# Patient Record
Sex: FEMALE | Race: WHITE | NOT HISPANIC OR LATINO | Employment: OTHER | URBAN - METROPOLITAN AREA
[De-identification: names, ages, dates, MRNs, and addresses within clinical notes are randomized per-mention and may not be internally consistent; named-entity substitution may affect disease eponyms.]

---

## 2021-08-25 ENCOUNTER — EVALUATION (OUTPATIENT)
Dept: PHYSICAL THERAPY | Facility: MEDICAL CENTER | Age: 81
End: 2021-08-25
Payer: COMMERCIAL

## 2021-08-25 DIAGNOSIS — M25.551 RIGHT HIP PAIN: ICD-10-CM

## 2021-08-25 DIAGNOSIS — G89.29 CHRONIC RIGHT-SIDED LOW BACK PAIN, UNSPECIFIED WHETHER SCIATICA PRESENT: Primary | ICD-10-CM

## 2021-08-25 DIAGNOSIS — M54.50 CHRONIC RIGHT-SIDED LOW BACK PAIN, UNSPECIFIED WHETHER SCIATICA PRESENT: Primary | ICD-10-CM

## 2021-08-25 PROCEDURE — 97140 MANUAL THERAPY 1/> REGIONS: CPT | Performed by: PHYSICAL THERAPIST

## 2021-08-25 PROCEDURE — 97162 PT EVAL MOD COMPLEX 30 MIN: CPT | Performed by: PHYSICAL THERAPIST

## 2021-08-25 NOTE — LETTER
2021    [id number]  [address] [employer]    Patient: Preston Goodwin   YOB: 1940   Date of Visit: 2021     Encounter Diagnosis     ICD-10-CM    1  Chronic right-sided low back pain, unspecified whether sciatica present  M54 5     G89 29    2  Right hip pain  M25 551        Dear Dr Huang Masters:    Thank you for your recent referral of Preston Goodwin  Please review the attached evaluation summary from Kandice's recent visit  Please verify that you agree with the plan of care by signing the attached order  If you have any questions or concerns, please do not hesitate to call  I sincerely appreciate the opportunity to share in the care of one of your patients and hope to have another opportunity to work with you in the near future  Sincerely,    Jay Jaytejas Hector, PT      Referring Provider:      I certify that I have read the below Plan of Care and certify the need for these services furnished under this plan of treatment while under my care  [id number]  [place] [address]  Via Fax: [phone]          PT Evaluation     Today's date: 2021  Patient name: Preston Goodwin  :   MRN: [id number]  Referring provider: Travon Taylor MD  Dx:   Encounter Diagnosis     ICD-10-CM    1  Chronic right-sided low back pain, unspecified whether sciatica present  M54 5     G89 29    2  Right hip pain  M25 551                   Assessment/Plan  Patient is a very pleasant 78 yo female who presents with symptoms of lower back pain and lateral right hip pain  Back pain is consistent with advanced degenerative changes and may be causing her hip pain as well however symptoms of hip pain could not be recreated with lumbar spine movement, special testing, or palpation and seems to be more indicative of greater trochanteric bursitis  Dramatic hip weakness as well as poor mobility of LE B    Patient will benefit from manual therapy to address hypomobility of lumbar spine, flexibility and strengthening program to address core weakness as well as LE weakness  Patient is expected to progress well with treatment  2x a week for 4-6 weeks  Subjective  Patient states that she has bee having lateral hip pain for several years that has been worsening over the past several months as her activity level has decreased  Patient notes that her pain is slightly in her back but mostly her right lateral leg starting at the hip  She is not able to stand more than 5 minutes at a time and her walking is limited to household and short community distances  She would like to go back to the gym  Objective  Red Flags: none  Pain: 4-7/10 increased pain with standing and SLS R  Reflexes: 2/5 B LE  Posture: left hip anteversion, right hip hiking with ambulation  AROM: limited in all LE movements with severe tightness of hip flexors, hamstrings, quads, full lumbar motion without recreation of symptoms  PROM: limited right LE hip and knee movement with recreation of lateral hip pain  PAROM: discomfort and hypomobility of lumbar spine throughout no recreation of comparable sign  Palpation: TTP right hip greater trochanter and surrounding tissue  Special Tests: - dural sign, - distraction, - slr, - crossed slr, + squat test  Coordination of Movement/strengthe: Patient demonstrates poor activation of Transversus Abdominus and multifidus force couple and loss of feed forward mechanism with reaching tasks  Patient was able to perform activation with cueing    Right Hip strength 3/5  Throughout, left hip strength 4/5 throughout  Goals:  - Pt I with initial HEP in 1-2 visits  - improve walking to community distance without increased pain  - improved stabilizing structure activation and improved feed forward mechanism with distal activation  - Increase Functional Status Measure measured by FOTO by [facility]  - return to exercise Precautions: hx of B TKA and R JOSUÉ

## 2021-08-25 NOTE — PROGRESS NOTES
PT Evaluation     Today's date: 2021  Patient name: Stella Park  :   MRN: [id number]  Referring provider: Amaya Minaya MD  Dx:   Encounter Diagnosis     ICD-10-CM    1  Chronic right-sided low back pain, unspecified whether sciatica present  M54 5     G89 29    2  Right hip pain  M25 551                   Assessment/Plan  Patient is a very pleasant 80 yo female who presents with symptoms of lower back pain and lateral right hip pain  Back pain is consistent with advanced degenerative changes and may be causing her hip pain as well however symptoms of hip pain could not be recreated with lumbar spine movement, special testing, or palpation and seems to be more indicative of greater trochanteric bursitis  Dramatic hip weakness as well as poor mobility of LE B  Patient will benefit from manual therapy to address hypomobility of lumbar spine, flexibility and strengthening program to address core weakness as well as LE weakness  Patient is expected to progress well with treatment  2x a week for 4-6 weeks  Subjective  Patient states that she has bee having lateral hip pain for several years that has been worsening over the past several months as her activity level has decreased  Patient notes that her pain is slightly in her back but mostly her right lateral leg starting at the hip  She is not able to stand more than 5 minutes at a time and her walking is limited to household and short community distances  She would like to go back to the gym  Objective  Red Flags: none  Pain: 4-7/10 increased pain with standing and SLS R  Reflexes: 2/5 B LE  Posture: left hip anteversion, right hip hiking with ambulation  AROM: limited in all LE movements with severe tightness of hip flexors, hamstrings, quads, full lumbar motion without recreation of symptoms     PROM: limited right LE hip and knee movement with recreation of lateral hip pain  PAROM: discomfort and hypomobility of lumbar spine throughout no recreation of comparable sign  Palpation: TTP right hip greater trochanter and surrounding tissue  Special Tests: - dural sign, - distraction, - slr, - crossed slr, + squat test  Coordination of Movement/strengthe: Patient demonstrates poor activation of Transversus Abdominus and multifidus force couple and loss of feed forward mechanism with reaching tasks  Patient was able to perform activation with cueing    Right Hip strength 3/5  Throughout, left hip strength 4/5 throughout  Goals:  - Pt I with initial HEP in 1-2 visits  - improve walking to community distance without increased pain  - improved stabilizing structure activation and improved feed forward mechanism with distal activation  - Increase Functional Status Measure measured by FOTO by [facility]  - return to exercise        Precautions: hx of B TKA and R JOSUÉ

## 2021-08-31 ENCOUNTER — OFFICE VISIT (OUTPATIENT)
Dept: PHYSICAL THERAPY | Facility: MEDICAL CENTER | Age: 81
End: 2021-08-31
Payer: COMMERCIAL

## 2021-08-31 DIAGNOSIS — M25.551 RIGHT HIP PAIN: ICD-10-CM

## 2021-08-31 DIAGNOSIS — M54.50 CHRONIC RIGHT-SIDED LOW BACK PAIN, UNSPECIFIED WHETHER SCIATICA PRESENT: Primary | ICD-10-CM

## 2021-08-31 DIAGNOSIS — G89.29 CHRONIC RIGHT-SIDED LOW BACK PAIN, UNSPECIFIED WHETHER SCIATICA PRESENT: Primary | ICD-10-CM

## 2021-08-31 PROCEDURE — 97110 THERAPEUTIC EXERCISES: CPT | Performed by: PHYSICAL THERAPIST

## 2021-08-31 NOTE — PROGRESS NOTES
Daily Note     Today's date: 2021  Patient name: Alice Pedroza  :   MRN: [id number]  Referring provider: Gael Rockwell MD  Dx:   Encounter Diagnosis     ICD-10-CM    1  Chronic right-sided low back pain, unspecified whether sciatica present  M54 5     G89 29    2  Right hip pain  M25 551                   Subjective: patient states that she is having more issues in the right lateral hip than in her lower back  Notes that it is gone when she sits  Objective: See treatment diary below      Assessment: Tolerated treatment well  Patient demonstrated fatigue post treatment, exhibited good technique with therapeutic exercises and would benefit from continued PT      Plan: Continue per plan of care        Precautions: hx of B TKA and R JOSUÉ        Daily Treatment Diary     Manual                                                                                   Exercise Diary              Hip flexor str 10sec x5            Hamstring str 10sec x5            Piriformis str 10sec x5            LTR 10sec x5            SKTC 10sec x5            SLR 10            Bridging with ball between knees 10            Clamshell with band 10            Standing hip abduction 10            Standing hip extension 10                                      Bike 10                                                                                                           Modalities                           Moist heat

## 2021-09-02 ENCOUNTER — OFFICE VISIT (OUTPATIENT)
Dept: PHYSICAL THERAPY | Facility: MEDICAL CENTER | Age: 81
End: 2021-09-02
Payer: COMMERCIAL

## 2021-09-02 DIAGNOSIS — M54.50 CHRONIC RIGHT-SIDED LOW BACK PAIN, UNSPECIFIED WHETHER SCIATICA PRESENT: Primary | ICD-10-CM

## 2021-09-02 DIAGNOSIS — M25.551 RIGHT HIP PAIN: ICD-10-CM

## 2021-09-02 DIAGNOSIS — G89.29 CHRONIC RIGHT-SIDED LOW BACK PAIN, UNSPECIFIED WHETHER SCIATICA PRESENT: Primary | ICD-10-CM

## 2021-09-02 PROCEDURE — 97110 THERAPEUTIC EXERCISES: CPT | Performed by: PHYSICAL THERAPIST

## 2021-09-02 PROCEDURE — 97140 MANUAL THERAPY 1/> REGIONS: CPT | Performed by: PHYSICAL THERAPIST

## 2021-09-02 NOTE — PROGRESS NOTES
Daily Note     Today's date: 2021  Patient name: Stella Park  : [birth year]  MRN: [id number]  Referring provider: Amaya Minaya MD  Dx:   Encounter Diagnosis     ICD-10-CM    1  Chronic right-sided low back pain, unspecified whether sciatica present  M54 5     G89 29    2  Right hip pain  M25 551                   Subjective: patient states that she is feeling pretty good today  Seems to be better with walking  Objective: See treatment diary below      Assessment: Tolerated treatment well  Patient demonstrated fatigue post treatment, exhibited good technique with therapeutic exercises and would benefit from continued PT  Patient was progressed with hip and core stability program        Plan: Continue per plan of care        Precautions: hx of B TKA and R JOSUÉ        Daily Treatment Diary     Manual              STM 10min                                                                    Exercise Diary              Hip flexor str 10sec x5            Hamstring str 10sec x5            Piriformis str 10sec x5            LTR 10sec x5            SKTC 10sec x5            SLR 10x2            Bridging with ball between knees 10x2            Clamshell with band 10x2            Standing hip abduction 10x2            Standing hip extension 10x2            squats 10x2                         Bike 10                                                                                                           Modalities                           Moist heat

## 2021-09-07 ENCOUNTER — OFFICE VISIT (OUTPATIENT)
Dept: PHYSICAL THERAPY | Facility: MEDICAL CENTER | Age: 81
End: 2021-09-07
Payer: COMMERCIAL

## 2021-09-07 DIAGNOSIS — M54.50 CHRONIC RIGHT-SIDED LOW BACK PAIN, UNSPECIFIED WHETHER SCIATICA PRESENT: Primary | ICD-10-CM

## 2021-09-07 DIAGNOSIS — M25.551 RIGHT HIP PAIN: ICD-10-CM

## 2021-09-07 DIAGNOSIS — G89.29 CHRONIC RIGHT-SIDED LOW BACK PAIN, UNSPECIFIED WHETHER SCIATICA PRESENT: Primary | ICD-10-CM

## 2021-09-07 PROCEDURE — 97110 THERAPEUTIC EXERCISES: CPT | Performed by: PHYSICAL THERAPIST

## 2021-09-07 PROCEDURE — 97140 MANUAL THERAPY 1/> REGIONS: CPT | Performed by: PHYSICAL THERAPIST

## 2021-09-07 NOTE — PROGRESS NOTES
Daily Note     Today's date: 2021  Patient name: Katerin Camacho  :   MRN: [id number]  Referring provider: Carline Cam MD  Dx:   Encounter Diagnosis     ICD-10-CM    1  Chronic right-sided low back pain, unspecified whether sciatica present  M54 5     G89 29    2  Right hip pain  M25 551                   Subjective: patient states that she is feeling better and did well with the manual therapy  Objective: See treatment diary below      Assessment: Tolerated treatment well  Patient demonstrated fatigue post treatment, exhibited good technique with therapeutic exercises and would benefit from continued PT  Patient was progressed with hip and core stability program        Plan: Continue per plan of care        Precautions: hx of B TKA and R JOSUÉ        Daily Treatment Diary     Manual              STM 10min                                                                    Exercise Diary              Hip flexor str 10sec x5            Hamstring str 10sec x5            Piriformis str 10sec x5            LTR 10sec x5            SKTC 10sec x5            SLR 10x2            Bridging with ball between knees 10x2            Clamshell with band 10x2            Standing hip abduction 10x2            Standing hip extension 10x2            squats 10x2                         Bike 10                                                                                                           Modalities                           Moist heat

## 2021-09-09 ENCOUNTER — OFFICE VISIT (OUTPATIENT)
Dept: PHYSICAL THERAPY | Facility: MEDICAL CENTER | Age: 81
End: 2021-09-09
Payer: COMMERCIAL

## 2021-09-09 DIAGNOSIS — M54.50 CHRONIC RIGHT-SIDED LOW BACK PAIN, UNSPECIFIED WHETHER SCIATICA PRESENT: Primary | ICD-10-CM

## 2021-09-09 DIAGNOSIS — G89.29 CHRONIC RIGHT-SIDED LOW BACK PAIN, UNSPECIFIED WHETHER SCIATICA PRESENT: Primary | ICD-10-CM

## 2021-09-09 DIAGNOSIS — M25.551 RIGHT HIP PAIN: ICD-10-CM

## 2021-09-09 PROCEDURE — 97140 MANUAL THERAPY 1/> REGIONS: CPT | Performed by: PHYSICAL THERAPIST

## 2021-09-09 PROCEDURE — 97110 THERAPEUTIC EXERCISES: CPT | Performed by: PHYSICAL THERAPIST

## 2021-09-09 NOTE — PROGRESS NOTES
Daily Note     Today's date: 2021  Patient name: Leonardo Gilbert  :   MRN: [id number]  Referring provider: Kylie Pettit MD  Dx:   Encounter Diagnosis     ICD-10-CM    1  Chronic right-sided low back pain, unspecified whether sciatica present  M54 5     G89 29    2  Right hip pain  M25 551                   Subjective: patient states that she is feeling better and did well with the manual therapy  Feeling much better overall  Objective: See treatment diary below      Assessment: Tolerated treatment well  Patient demonstrated fatigue post treatment, exhibited good technique with therapeutic exercises and would benefit from continued PT  Patient was progressed with hip and core stability program        Plan: Continue per plan of care        Precautions: hx of B TKA and R JOSUÉ        Daily Treatment Diary     Manual              STM 10min                                                                    Exercise Diary              Hip flexor str 10sec x5            Hamstring str 10sec x5            Piriformis str 10sec x5            LTR 10sec x5            SKTC 10sec x5            SLR 10x2            Bridging with ball between knees 10x2            Clamshell with band 10x2            Standing hip abduction 10x2            Standing hip extension 10x2            squats 10x2                         Bike 10                                                                                                           Modalities                           Moist heat

## 2021-09-14 ENCOUNTER — OFFICE VISIT (OUTPATIENT)
Dept: PHYSICAL THERAPY | Facility: MEDICAL CENTER | Age: 81
End: 2021-09-14
Payer: COMMERCIAL

## 2021-09-14 DIAGNOSIS — M25.551 RIGHT HIP PAIN: ICD-10-CM

## 2021-09-14 DIAGNOSIS — M54.50 CHRONIC RIGHT-SIDED LOW BACK PAIN, UNSPECIFIED WHETHER SCIATICA PRESENT: Primary | ICD-10-CM

## 2021-09-14 DIAGNOSIS — G89.29 CHRONIC RIGHT-SIDED LOW BACK PAIN, UNSPECIFIED WHETHER SCIATICA PRESENT: Primary | ICD-10-CM

## 2021-09-14 PROCEDURE — 97140 MANUAL THERAPY 1/> REGIONS: CPT | Performed by: PHYSICAL THERAPIST

## 2021-09-14 PROCEDURE — 97110 THERAPEUTIC EXERCISES: CPT | Performed by: PHYSICAL THERAPIST

## 2021-09-14 NOTE — PROGRESS NOTES
Daily Note     Today's date: 2021  Patient name: Milana Reece  :   MRN: [id number]  Referring provider: Candi Banegas MD  Dx:   Encounter Diagnosis     ICD-10-CM    1  Chronic right-sided low back pain, unspecified whether sciatica present  M54 5     G89 29    2  Right hip pain  M25 551                   Subjective: patient states that she is feeling better and did well with the manual therapy  Feeling much better overall  Objective: See treatment diary below      Assessment: Tolerated treatment well  Patient demonstrated fatigue post treatment, exhibited good technique with therapeutic exercises and would benefit from continued PT  Patient was progressed with hip and core stability program  Patient seems to be getting relief with   Plan: Continue per plan of care        Precautions: hx of B TKA and R JOSUÉ        Daily Treatment Diary     Manual              STM 10min                                                                    Exercise Diary              Hip flexor str 10sec x5            Hamstring str 10sec x5            Piriformis str 10sec x5            LTR 10sec x5            SKTC 10sec x5            SLR 10x2            Bridging with ball between knees 10x2            Clamshell with band 10x2            Standing hip abduction 10x2            Standing hip extension 10x2            squats 10x2                         Bike 10                                                                                                           Modalities                           Moist heat

## 2021-09-16 ENCOUNTER — APPOINTMENT (OUTPATIENT)
Dept: PHYSICAL THERAPY | Facility: MEDICAL CENTER | Age: 81
End: 2021-09-16
Payer: COMMERCIAL

## 2021-09-21 ENCOUNTER — OFFICE VISIT (OUTPATIENT)
Dept: PHYSICAL THERAPY | Facility: MEDICAL CENTER | Age: 81
End: 2021-09-21
Payer: COMMERCIAL

## 2021-09-21 DIAGNOSIS — M25.551 RIGHT HIP PAIN: ICD-10-CM

## 2021-09-21 DIAGNOSIS — G89.29 CHRONIC RIGHT-SIDED LOW BACK PAIN, UNSPECIFIED WHETHER SCIATICA PRESENT: Primary | ICD-10-CM

## 2021-09-21 DIAGNOSIS — M54.50 CHRONIC RIGHT-SIDED LOW BACK PAIN, UNSPECIFIED WHETHER SCIATICA PRESENT: Primary | ICD-10-CM

## 2021-09-21 PROCEDURE — 97140 MANUAL THERAPY 1/> REGIONS: CPT | Performed by: PHYSICAL THERAPIST

## 2021-09-21 PROCEDURE — 97110 THERAPEUTIC EXERCISES: CPT | Performed by: PHYSICAL THERAPIST

## 2021-09-21 NOTE — PROGRESS NOTES
Daily Note     Today's date: 2021  Patient name: Leonardo Gilbert  : [birth year]  MRN: [id number]  Referring provider: Kylie Pettit MD  Dx:   Encounter Diagnosis     ICD-10-CM    1  Chronic right-sided low back pain, unspecified whether sciatica present  M54 5     G89 29    2  Right hip pain  M25 551                   Subjective: patient states that she is feeling better  Not having much pain at this point in the lateral right hip  Objective: See treatment diary below      Assessment: Tolerated treatment well  Patient demonstrated fatigue post treatment, exhibited good technique with therapeutic exercises and would benefit from continued PT  Patient was progressed with hip and core stability program  Patient seems to be getting relief with   Plan: Continue per plan of care        Precautions: hx of B TKA and R JOSUÉ        Daily Treatment Diary     Manual              STM 10min                                                                    Exercise Diary              Hip flexor str 10sec x5            Hamstring str 10sec x5            Piriformis str 10sec x5            LTR 10sec x5            SKTC 10sec x5            SLR 10x2            Bridging with ball between knees 10x2            Clamshell with band 10x2            Standing hip abduction 10x2            Standing hip extension 10x2            squats 10x2                         Bike 10                                                                                                           Modalities                           Moist heat

## 2021-09-23 ENCOUNTER — OFFICE VISIT (OUTPATIENT)
Dept: PHYSICAL THERAPY | Facility: MEDICAL CENTER | Age: 81
End: 2021-09-23
Payer: COMMERCIAL

## 2021-09-23 DIAGNOSIS — M25.551 RIGHT HIP PAIN: ICD-10-CM

## 2021-09-23 DIAGNOSIS — G89.29 CHRONIC RIGHT-SIDED LOW BACK PAIN, UNSPECIFIED WHETHER SCIATICA PRESENT: Primary | ICD-10-CM

## 2021-09-23 DIAGNOSIS — M54.50 CHRONIC RIGHT-SIDED LOW BACK PAIN, UNSPECIFIED WHETHER SCIATICA PRESENT: Primary | ICD-10-CM

## 2021-09-23 PROCEDURE — 97110 THERAPEUTIC EXERCISES: CPT | Performed by: PHYSICAL THERAPIST

## 2021-09-23 PROCEDURE — 97140 MANUAL THERAPY 1/> REGIONS: CPT | Performed by: PHYSICAL THERAPIST

## 2021-09-23 NOTE — PROGRESS NOTES
Daily Note     Today's date: 2021  Patient name: Preston Goodwin  : [birth year]  MRN: [id number]  Referring provider: Travon Taylor MD  Dx:   Encounter Diagnosis     ICD-10-CM    1  Chronic right-sided low back pain, unspecified whether sciatica present  M54 5     G89 29    2  Right hip pain  M25 551                   Subjective: patient states that she is feeling better  Notes that most ADL's seem to be improving  Objective: See treatment diary below      Assessment: Tolerated treatment well  Patient demonstrated fatigue post treatment, exhibited good technique with therapeutic exercises and would benefit from continued PT  Patient was progressed with hip and core stability program  Patient seems to be getting relief with   Plan: Continue per plan of care        Precautions: hx of B TKA and R JOSUÉ        Daily Treatment Diary     Manual              STM 10min                                                                    Exercise Diary              Hip flexor str 10sec x5            Hamstring str 10sec x5            Piriformis str 10sec x5            LTR 10sec x5            SKTC 10sec x5            SLR 10x2            Bridging with ball between knees 10x2            Clamshell with band 10x2            Standing hip abduction 10x2            Standing hip extension 10x2            squats 10x2                         Bike 10                                                                                                           Modalities                           Moist heat

## 2021-09-28 ENCOUNTER — APPOINTMENT (OUTPATIENT)
Dept: PHYSICAL THERAPY | Facility: MEDICAL CENTER | Age: 81
End: 2021-09-28
Payer: COMMERCIAL

## 2021-09-30 ENCOUNTER — APPOINTMENT (OUTPATIENT)
Dept: PHYSICAL THERAPY | Facility: MEDICAL CENTER | Age: 81
End: 2021-09-30
Payer: COMMERCIAL

## 2021-10-04 ENCOUNTER — APPOINTMENT (OUTPATIENT)
Dept: PHYSICAL THERAPY | Facility: MEDICAL CENTER | Age: 81
End: 2021-10-04
Payer: COMMERCIAL

## 2021-10-06 ENCOUNTER — OFFICE VISIT (OUTPATIENT)
Dept: PHYSICAL THERAPY | Facility: MEDICAL CENTER | Age: 81
End: 2021-10-06
Payer: COMMERCIAL

## 2021-10-06 DIAGNOSIS — M25.551 RIGHT HIP PAIN: ICD-10-CM

## 2021-10-06 DIAGNOSIS — M54.50 CHRONIC RIGHT-SIDED LOW BACK PAIN, UNSPECIFIED WHETHER SCIATICA PRESENT: Primary | ICD-10-CM

## 2021-10-06 DIAGNOSIS — G89.29 CHRONIC RIGHT-SIDED LOW BACK PAIN, UNSPECIFIED WHETHER SCIATICA PRESENT: Primary | ICD-10-CM

## 2021-10-06 PROCEDURE — 97110 THERAPEUTIC EXERCISES: CPT | Performed by: PHYSICAL THERAPIST

## 2021-10-06 PROCEDURE — 97140 MANUAL THERAPY 1/> REGIONS: CPT | Performed by: PHYSICAL THERAPIST

## 2021-10-11 ENCOUNTER — OFFICE VISIT (OUTPATIENT)
Dept: PHYSICAL THERAPY | Facility: MEDICAL CENTER | Age: 81
End: 2021-10-11
Payer: COMMERCIAL

## 2021-10-11 DIAGNOSIS — G89.29 CHRONIC RIGHT-SIDED LOW BACK PAIN, UNSPECIFIED WHETHER SCIATICA PRESENT: Primary | ICD-10-CM

## 2021-10-11 DIAGNOSIS — M25.551 RIGHT HIP PAIN: ICD-10-CM

## 2021-10-11 DIAGNOSIS — M54.50 CHRONIC RIGHT-SIDED LOW BACK PAIN, UNSPECIFIED WHETHER SCIATICA PRESENT: Primary | ICD-10-CM

## 2021-10-11 PROCEDURE — 97140 MANUAL THERAPY 1/> REGIONS: CPT | Performed by: PHYSICAL THERAPIST

## 2021-10-11 PROCEDURE — 97110 THERAPEUTIC EXERCISES: CPT | Performed by: PHYSICAL THERAPIST

## 2021-10-13 ENCOUNTER — OFFICE VISIT (OUTPATIENT)
Dept: PHYSICAL THERAPY | Facility: MEDICAL CENTER | Age: 81
End: 2021-10-13
Payer: COMMERCIAL

## 2021-10-13 DIAGNOSIS — G89.29 CHRONIC RIGHT-SIDED LOW BACK PAIN, UNSPECIFIED WHETHER SCIATICA PRESENT: Primary | ICD-10-CM

## 2021-10-13 DIAGNOSIS — M54.50 CHRONIC RIGHT-SIDED LOW BACK PAIN, UNSPECIFIED WHETHER SCIATICA PRESENT: Primary | ICD-10-CM

## 2021-10-13 DIAGNOSIS — M25.551 RIGHT HIP PAIN: ICD-10-CM

## 2021-10-13 PROCEDURE — 97110 THERAPEUTIC EXERCISES: CPT | Performed by: PHYSICAL THERAPIST

## 2021-10-13 PROCEDURE — 97140 MANUAL THERAPY 1/> REGIONS: CPT | Performed by: PHYSICAL THERAPIST

## 2021-10-19 ENCOUNTER — OFFICE VISIT (OUTPATIENT)
Dept: PHYSICAL THERAPY | Facility: MEDICAL CENTER | Age: 81
End: 2021-10-19
Payer: COMMERCIAL

## 2021-10-19 DIAGNOSIS — M54.50 CHRONIC RIGHT-SIDED LOW BACK PAIN, UNSPECIFIED WHETHER SCIATICA PRESENT: Primary | ICD-10-CM

## 2021-10-19 DIAGNOSIS — M25.551 RIGHT HIP PAIN: ICD-10-CM

## 2021-10-19 DIAGNOSIS — G89.29 CHRONIC RIGHT-SIDED LOW BACK PAIN, UNSPECIFIED WHETHER SCIATICA PRESENT: Primary | ICD-10-CM

## 2021-10-19 PROCEDURE — 97140 MANUAL THERAPY 1/> REGIONS: CPT | Performed by: PHYSICAL THERAPIST

## 2021-10-19 PROCEDURE — 97110 THERAPEUTIC EXERCISES: CPT | Performed by: PHYSICAL THERAPIST

## 2021-10-22 ENCOUNTER — APPOINTMENT (OUTPATIENT)
Dept: PHYSICAL THERAPY | Facility: MEDICAL CENTER | Age: 81
End: 2021-10-22
Payer: COMMERCIAL

## 2021-11-01 ENCOUNTER — OFFICE VISIT (OUTPATIENT)
Dept: PHYSICAL THERAPY | Facility: MEDICAL CENTER | Age: 81
End: 2021-11-01
Payer: COMMERCIAL

## 2021-11-01 DIAGNOSIS — M25.512 CHRONIC PAIN OF BOTH SHOULDERS: Primary | ICD-10-CM

## 2021-11-01 DIAGNOSIS — M25.511 CHRONIC PAIN OF BOTH SHOULDERS: Primary | ICD-10-CM

## 2021-11-01 DIAGNOSIS — G89.29 CHRONIC PAIN OF BOTH SHOULDERS: Primary | ICD-10-CM

## 2021-11-01 PROCEDURE — 97140 MANUAL THERAPY 1/> REGIONS: CPT | Performed by: PHYSICAL THERAPIST

## 2021-11-01 PROCEDURE — 97164 PT RE-EVAL EST PLAN CARE: CPT | Performed by: PHYSICAL THERAPIST

## 2021-11-10 ENCOUNTER — APPOINTMENT (OUTPATIENT)
Dept: PHYSICAL THERAPY | Facility: MEDICAL CENTER | Age: 81
End: 2021-11-10
Payer: COMMERCIAL

## 2021-11-11 ENCOUNTER — OFFICE VISIT (OUTPATIENT)
Dept: PHYSICAL THERAPY | Facility: MEDICAL CENTER | Age: 81
End: 2021-11-11
Payer: COMMERCIAL

## 2021-11-11 DIAGNOSIS — M54.50 CHRONIC RIGHT-SIDED LOW BACK PAIN, UNSPECIFIED WHETHER SCIATICA PRESENT: ICD-10-CM

## 2021-11-11 DIAGNOSIS — G89.29 CHRONIC PAIN OF BOTH SHOULDERS: Primary | ICD-10-CM

## 2021-11-11 DIAGNOSIS — G89.29 CHRONIC RIGHT-SIDED LOW BACK PAIN, UNSPECIFIED WHETHER SCIATICA PRESENT: ICD-10-CM

## 2021-11-11 DIAGNOSIS — M25.511 CHRONIC PAIN OF BOTH SHOULDERS: Primary | ICD-10-CM

## 2021-11-11 DIAGNOSIS — M25.512 CHRONIC PAIN OF BOTH SHOULDERS: Primary | ICD-10-CM

## 2021-11-11 DIAGNOSIS — M25.551 RIGHT HIP PAIN: ICD-10-CM

## 2021-11-11 PROCEDURE — 97110 THERAPEUTIC EXERCISES: CPT | Performed by: PHYSICAL THERAPIST

## 2021-11-11 PROCEDURE — 97010 HOT OR COLD PACKS THERAPY: CPT | Performed by: PHYSICAL THERAPIST

## 2021-11-11 PROCEDURE — 97140 MANUAL THERAPY 1/> REGIONS: CPT | Performed by: PHYSICAL THERAPIST

## 2021-11-12 ENCOUNTER — APPOINTMENT (OUTPATIENT)
Dept: PHYSICAL THERAPY | Facility: MEDICAL CENTER | Age: 81
End: 2021-11-12
Payer: COMMERCIAL

## 2021-11-16 ENCOUNTER — OFFICE VISIT (OUTPATIENT)
Dept: PHYSICAL THERAPY | Facility: MEDICAL CENTER | Age: 81
End: 2021-11-16
Payer: COMMERCIAL

## 2021-11-16 DIAGNOSIS — M25.512 CHRONIC PAIN OF BOTH SHOULDERS: ICD-10-CM

## 2021-11-16 DIAGNOSIS — M25.551 RIGHT HIP PAIN: ICD-10-CM

## 2021-11-16 DIAGNOSIS — G89.29 CHRONIC RIGHT-SIDED LOW BACK PAIN, UNSPECIFIED WHETHER SCIATICA PRESENT: Primary | ICD-10-CM

## 2021-11-16 DIAGNOSIS — G89.29 CHRONIC PAIN OF BOTH SHOULDERS: ICD-10-CM

## 2021-11-16 DIAGNOSIS — M25.511 CHRONIC PAIN OF BOTH SHOULDERS: ICD-10-CM

## 2021-11-16 DIAGNOSIS — M54.50 CHRONIC RIGHT-SIDED LOW BACK PAIN, UNSPECIFIED WHETHER SCIATICA PRESENT: Primary | ICD-10-CM

## 2021-11-16 PROCEDURE — 97010 HOT OR COLD PACKS THERAPY: CPT | Performed by: PHYSICAL THERAPIST

## 2021-11-16 PROCEDURE — 97110 THERAPEUTIC EXERCISES: CPT | Performed by: PHYSICAL THERAPIST

## 2021-11-16 PROCEDURE — 97140 MANUAL THERAPY 1/> REGIONS: CPT | Performed by: PHYSICAL THERAPIST

## 2021-11-18 ENCOUNTER — OFFICE VISIT (OUTPATIENT)
Dept: PHYSICAL THERAPY | Facility: MEDICAL CENTER | Age: 81
End: 2021-11-18
Payer: COMMERCIAL

## 2021-11-18 DIAGNOSIS — M25.551 RIGHT HIP PAIN: Primary | ICD-10-CM

## 2021-11-18 DIAGNOSIS — G89.29 CHRONIC RIGHT-SIDED LOW BACK PAIN, UNSPECIFIED WHETHER SCIATICA PRESENT: ICD-10-CM

## 2021-11-18 DIAGNOSIS — M25.511 CHRONIC PAIN OF BOTH SHOULDERS: ICD-10-CM

## 2021-11-18 DIAGNOSIS — G89.29 CHRONIC PAIN OF BOTH SHOULDERS: ICD-10-CM

## 2021-11-18 DIAGNOSIS — M54.50 CHRONIC RIGHT-SIDED LOW BACK PAIN, UNSPECIFIED WHETHER SCIATICA PRESENT: ICD-10-CM

## 2021-11-18 DIAGNOSIS — M25.512 CHRONIC PAIN OF BOTH SHOULDERS: ICD-10-CM

## 2021-11-18 PROCEDURE — 97110 THERAPEUTIC EXERCISES: CPT | Performed by: PHYSICAL THERAPIST

## 2021-11-18 PROCEDURE — 97140 MANUAL THERAPY 1/> REGIONS: CPT | Performed by: PHYSICAL THERAPIST

## 2021-11-24 ENCOUNTER — APPOINTMENT (OUTPATIENT)
Dept: PHYSICAL THERAPY | Facility: MEDICAL CENTER | Age: 81
End: 2021-11-24
Payer: COMMERCIAL

## 2021-12-03 ENCOUNTER — OFFICE VISIT (OUTPATIENT)
Dept: PHYSICAL THERAPY | Facility: MEDICAL CENTER | Age: 81
End: 2021-12-03
Payer: COMMERCIAL

## 2021-12-03 DIAGNOSIS — M54.50 CHRONIC RIGHT-SIDED LOW BACK PAIN, UNSPECIFIED WHETHER SCIATICA PRESENT: ICD-10-CM

## 2021-12-03 DIAGNOSIS — M25.551 RIGHT HIP PAIN: ICD-10-CM

## 2021-12-03 DIAGNOSIS — G89.29 CHRONIC PAIN OF BOTH SHOULDERS: Primary | ICD-10-CM

## 2021-12-03 DIAGNOSIS — M25.512 CHRONIC PAIN OF BOTH SHOULDERS: Primary | ICD-10-CM

## 2021-12-03 DIAGNOSIS — M25.511 CHRONIC PAIN OF BOTH SHOULDERS: Primary | ICD-10-CM

## 2021-12-03 DIAGNOSIS — G89.29 CHRONIC RIGHT-SIDED LOW BACK PAIN, UNSPECIFIED WHETHER SCIATICA PRESENT: ICD-10-CM

## 2021-12-03 PROCEDURE — 97140 MANUAL THERAPY 1/> REGIONS: CPT | Performed by: PHYSICAL THERAPIST

## 2021-12-07 ENCOUNTER — OFFICE VISIT (OUTPATIENT)
Dept: PHYSICAL THERAPY | Facility: MEDICAL CENTER | Age: 81
End: 2021-12-07
Payer: COMMERCIAL

## 2021-12-07 DIAGNOSIS — M25.511 CHRONIC PAIN OF BOTH SHOULDERS: Primary | ICD-10-CM

## 2021-12-07 DIAGNOSIS — M54.50 CHRONIC RIGHT-SIDED LOW BACK PAIN, UNSPECIFIED WHETHER SCIATICA PRESENT: ICD-10-CM

## 2021-12-07 DIAGNOSIS — G89.29 CHRONIC PAIN OF BOTH SHOULDERS: Primary | ICD-10-CM

## 2021-12-07 DIAGNOSIS — M25.551 RIGHT HIP PAIN: ICD-10-CM

## 2021-12-07 DIAGNOSIS — M25.512 CHRONIC PAIN OF BOTH SHOULDERS: Primary | ICD-10-CM

## 2021-12-07 DIAGNOSIS — G89.29 CHRONIC RIGHT-SIDED LOW BACK PAIN, UNSPECIFIED WHETHER SCIATICA PRESENT: ICD-10-CM

## 2021-12-07 PROCEDURE — 97010 HOT OR COLD PACKS THERAPY: CPT | Performed by: PHYSICAL THERAPIST

## 2021-12-07 PROCEDURE — 97110 THERAPEUTIC EXERCISES: CPT | Performed by: PHYSICAL THERAPIST

## 2021-12-07 PROCEDURE — 97140 MANUAL THERAPY 1/> REGIONS: CPT | Performed by: PHYSICAL THERAPIST

## 2021-12-09 ENCOUNTER — APPOINTMENT (OUTPATIENT)
Dept: PHYSICAL THERAPY | Facility: MEDICAL CENTER | Age: 81
End: 2021-12-09
Payer: COMMERCIAL

## 2021-12-16 ENCOUNTER — OFFICE VISIT (OUTPATIENT)
Dept: PHYSICAL THERAPY | Facility: MEDICAL CENTER | Age: 81
End: 2021-12-16
Payer: COMMERCIAL

## 2021-12-16 DIAGNOSIS — M25.551 RIGHT HIP PAIN: ICD-10-CM

## 2021-12-16 DIAGNOSIS — M54.50 CHRONIC RIGHT-SIDED LOW BACK PAIN, UNSPECIFIED WHETHER SCIATICA PRESENT: ICD-10-CM

## 2021-12-16 DIAGNOSIS — G89.29 CHRONIC PAIN OF BOTH SHOULDERS: Primary | ICD-10-CM

## 2021-12-16 DIAGNOSIS — G89.29 CHRONIC RIGHT-SIDED LOW BACK PAIN, UNSPECIFIED WHETHER SCIATICA PRESENT: ICD-10-CM

## 2021-12-16 DIAGNOSIS — M25.511 CHRONIC PAIN OF BOTH SHOULDERS: Primary | ICD-10-CM

## 2021-12-16 DIAGNOSIS — M25.512 CHRONIC PAIN OF BOTH SHOULDERS: Primary | ICD-10-CM

## 2021-12-16 PROCEDURE — 97164 PT RE-EVAL EST PLAN CARE: CPT | Performed by: PHYSICAL THERAPIST

## 2021-12-21 ENCOUNTER — APPOINTMENT (OUTPATIENT)
Dept: PHYSICAL THERAPY | Facility: MEDICAL CENTER | Age: 81
End: 2021-12-21
Payer: COMMERCIAL

## 2021-12-28 ENCOUNTER — APPOINTMENT (OUTPATIENT)
Dept: PHYSICAL THERAPY | Facility: MEDICAL CENTER | Age: 81
End: 2021-12-28
Payer: COMMERCIAL